# Patient Record
Sex: FEMALE | Race: OTHER | NOT HISPANIC OR LATINO | URBAN - METROPOLITAN AREA
[De-identification: names, ages, dates, MRNs, and addresses within clinical notes are randomized per-mention and may not be internally consistent; named-entity substitution may affect disease eponyms.]

---

## 2017-04-03 ENCOUNTER — EMERGENCY (EMERGENCY)
Facility: HOSPITAL | Age: 27
LOS: 1 days | Discharge: HOME | End: 2017-04-03
Attending: EMERGENCY MEDICINE | Admitting: EMERGENCY MEDICINE
Payer: COMMERCIAL

## 2017-04-03 VITALS
HEIGHT: 64 IN | TEMPERATURE: 98 F | RESPIRATION RATE: 18 BRPM | WEIGHT: 126.99 LBS | HEART RATE: 75 BPM | SYSTOLIC BLOOD PRESSURE: 92 MMHG | DIASTOLIC BLOOD PRESSURE: 64 MMHG | OXYGEN SATURATION: 96 %

## 2017-04-03 DIAGNOSIS — K52.9 NONINFECTIVE GASTROENTERITIS AND COLITIS, UNSPECIFIED: ICD-10-CM

## 2017-04-03 DIAGNOSIS — R19.7 DIARRHEA, UNSPECIFIED: ICD-10-CM

## 2017-04-03 LAB
ALBUMIN SERPL ELPH-MCNC: 4.1 G/DL — SIGNIFICANT CHANGE UP (ref 3.4–5)
ALP SERPL-CCNC: 73 U/L — SIGNIFICANT CHANGE UP (ref 40–120)
ALT FLD-CCNC: 27 U/L — SIGNIFICANT CHANGE UP (ref 12–42)
AMYLASE P1 CFR SERPL: 52 U/L — SIGNIFICANT CHANGE UP (ref 25–115)
ANION GAP SERPL CALC-SCNC: 12 MMOL/L — SIGNIFICANT CHANGE UP (ref 9–16)
AST SERPL-CCNC: 21 U/L — SIGNIFICANT CHANGE UP (ref 15–37)
BILIRUB SERPL-MCNC: 0.8 MG/DL — SIGNIFICANT CHANGE UP (ref 0.2–1.2)
BUN SERPL-MCNC: 15 MG/DL — SIGNIFICANT CHANGE UP (ref 7–23)
CALCIUM SERPL-MCNC: 9.1 MG/DL — SIGNIFICANT CHANGE UP (ref 8.5–10.5)
CHLORIDE SERPL-SCNC: 103 MMOL/L — SIGNIFICANT CHANGE UP (ref 96–108)
CO2 SERPL-SCNC: 23 MMOL/L — SIGNIFICANT CHANGE UP (ref 22–31)
CREAT SERPL-MCNC: 0.79 MG/DL — SIGNIFICANT CHANGE UP (ref 0.5–1.3)
GLUCOSE SERPL-MCNC: 146 MG/DL — HIGH (ref 70–99)
HCT VFR BLD CALC: 39.4 % — SIGNIFICANT CHANGE UP (ref 34.5–45)
HGB BLD-MCNC: 13.8 G/DL — SIGNIFICANT CHANGE UP (ref 11.5–15.5)
LIDOCAIN IGE QN: 86 U/L — SIGNIFICANT CHANGE UP (ref 73–393)
MCHC RBC-ENTMCNC: 31.2 PG — SIGNIFICANT CHANGE UP (ref 27–34)
MCHC RBC-ENTMCNC: 35 G/DL — SIGNIFICANT CHANGE UP (ref 32–36)
MCV RBC AUTO: 89.1 FL — SIGNIFICANT CHANGE UP (ref 80–100)
PLATELET # BLD AUTO: 220 K/UL — SIGNIFICANT CHANGE UP (ref 150–400)
POTASSIUM SERPL-MCNC: 4.1 MMOL/L — SIGNIFICANT CHANGE UP (ref 3.5–5.3)
POTASSIUM SERPL-SCNC: 4.1 MMOL/L — SIGNIFICANT CHANGE UP (ref 3.5–5.3)
PROT SERPL-MCNC: 7.2 G/DL — SIGNIFICANT CHANGE UP (ref 6.4–8.2)
RBC # BLD: 4.42 M/UL — SIGNIFICANT CHANGE UP (ref 3.8–5.2)
RBC # FLD: 12.3 % — SIGNIFICANT CHANGE UP (ref 10.3–16.9)
SODIUM SERPL-SCNC: 138 MMOL/L — SIGNIFICANT CHANGE UP (ref 132–145)
WBC # BLD: 13.8 K/UL — HIGH (ref 3.8–10.5)
WBC # FLD AUTO: 13.8 K/UL — HIGH (ref 3.8–10.5)

## 2017-04-03 PROCEDURE — 74010: CPT | Mod: 26

## 2017-04-03 PROCEDURE — 74020: CPT | Mod: 26

## 2017-04-03 PROCEDURE — 99284 EMERGENCY DEPT VISIT MOD MDM: CPT | Mod: 25

## 2017-04-03 RX ORDER — FAMOTIDINE 10 MG/ML
20 INJECTION INTRAVENOUS ONCE
Qty: 0 | Refills: 0 | Status: COMPLETED | OUTPATIENT
Start: 2017-04-03 | End: 2017-04-03

## 2017-04-03 RX ORDER — ONDANSETRON 8 MG/1
1 TABLET, FILM COATED ORAL
Qty: 12 | Refills: 0 | OUTPATIENT
Start: 2017-04-03 | End: 2017-04-06

## 2017-04-03 RX ORDER — METOCLOPRAMIDE HCL 10 MG
10 TABLET ORAL ONCE
Qty: 0 | Refills: 0 | Status: COMPLETED | OUTPATIENT
Start: 2017-04-03 | End: 2017-04-03

## 2017-04-03 RX ORDER — ONDANSETRON 8 MG/1
4 TABLET, FILM COATED ORAL ONCE
Qty: 0 | Refills: 0 | Status: COMPLETED | OUTPATIENT
Start: 2017-04-03 | End: 2017-04-03

## 2017-04-03 RX ORDER — SODIUM CHLORIDE 9 MG/ML
1000 INJECTION INTRAMUSCULAR; INTRAVENOUS; SUBCUTANEOUS ONCE
Qty: 0 | Refills: 0 | Status: COMPLETED | OUTPATIENT
Start: 2017-04-03 | End: 2017-04-03

## 2017-04-03 RX ADMIN — FAMOTIDINE 20 MILLIGRAM(S): 10 INJECTION INTRAVENOUS at 01:37

## 2017-04-03 RX ADMIN — ONDANSETRON 4 MILLIGRAM(S): 8 TABLET, FILM COATED ORAL at 00:53

## 2017-04-03 RX ADMIN — Medication 10 MILLIGRAM(S): at 01:37

## 2017-04-03 RX ADMIN — SODIUM CHLORIDE 1000 MILLILITER(S): 9 INJECTION INTRAMUSCULAR; INTRAVENOUS; SUBCUTANEOUS at 01:04

## 2017-04-03 NOTE — ED PROVIDER NOTE - OBJECTIVE STATEMENT
26 yo female G0Po, lmp 2 weeks ago, +IUD in place, to ED c/o n/v/d/epigastric abdominal cramping sudden onset 1700 yesterday. Pt states has had 20 episodes of vomiting/diarrhea. No international travel within past 30 days, denies f/c/rash/dysuria.  Cannot recall consuming any bad food. No ill contacts. 28 yo female G0Po, lmp 2 weeks ago, +IUD in place, to ED c/o n/v/d/epigastric abdominal cramping sudden onset 1700 yesterday. Pt states has had 20 episodes of vomiting/diarrhea. No international travel within past 30 days, denies f/c/rash/dysuria.  Cannot recall consuming any bad food. No ill contacts. Denies recent abx use.

## 2017-04-03 NOTE — ED ADULT TRIAGE NOTE - CHIEF COMPLAINT QUOTE
walked in c/o epigastric pain radiating to umbilical area with N/V/D, non-bloody since 5 pm yesterday

## 2018-10-12 NOTE — ED ADULT NURSE NOTE - THOUGHTS OF SUICIDE/SELF-HARM YN, MLM
October 12, 2018      Malorie SHERMAN [address]     [place] [address]    Dear ,      This letter is to remind you that you are due for your mammogram     Please call [phone] to schedule your appointment at your earliest convenience.     If you have completed the tests outside of [place], please have the results forwarded to our office. We will update the chart for your primary Physician to review before your next annual physical.     Sincerely,      Jocelynn Pham MD/tk     No

## 2021-08-26 PROBLEM — Z00.00 ENCOUNTER FOR PREVENTIVE HEALTH EXAMINATION: Status: ACTIVE | Noted: 2021-08-26

## 2021-08-27 NOTE — PHYSICAL EXAM
[de-identified] : General: Patient is awake and alert, demonstrates appropriate mood and affect, exhibits normal breathing and is in no acute distress.\par Constitutional: Well appearing in no apparent distress\par Skin: The skin is intact, warm, pink, and dry over the area examined.\par Lymph: There is no lymphedema\par Cardiovascular: There is brisk capillary refill in the digits of the affected extremity. They are symmetric pulses in the bilateral upper and lower extremities. \par Respiratory: The patient is in no apparent respiratory distress. They're taking full deep breaths without use of accessory muscles or evidence of audible wheezes or stridor without the use of a stethoscope. \par Neurological: 5/5 motor strength in the main muscle groups of bilateral upper extremities, sensory intact in bilateral upper extremities\par Musculoskeletal:. normal gait for age. good posture. normal clinical alignment in upper and lower extremities. normal clinical alignment of the spine. full range of motion in bilateral upper and lower extremities except as noted below\par \par Gait: [normal nonantalgic gait, normal dain, heel strike and toe off]\par \par Range of Motion: \par Right:                                                                            Left:\par Active [0-130] / Passive [0-130]                                Active [0-130] / Passive [0-130]\par \par [Right/Left] Knee\par \par Skin: \par Intact\par Erythema [No]\par Effusion [No]\par \par Patellofemoral: \par TTP Quad Insertion [No]\par TTP Distal Pole Patella: [No]\par Patellar tenderness [No]\par Patellar grind  [negative]\par Patellar compression test [negative] \par Tracking: [normal]\par J-sign:  [negative]\par Apprehension  [negative]\par Lateral translation:  [2] quadrants \par Medial translation: [2] quadrants \par \par Strength: [5]/5 extension,  [5]/5 flexion\par \par Tenderness: [None]\par \par Stability: \par Varus stress: [stable]  at 0 and 30 degrees\par Valgus stress: [stable] at 0 and 30 degrees\par Lachman: [1A]\par Anterior drawer:  [negative]\par Posterior drawer:  [negative] \par Pivot-shift:  [negative] \par \par Meniscus:\par Jointline tenderness:  [No] \par Cary:  [negative] \par Thessaly: [negative] \par \par Motor: EHL/FHL/TA/Gs intact\par \par Sens: S/S/T/SPN/DPN intact to light touch\par \par Vasc: 2+ DP and PT Pulses\par \par

## 2021-08-27 NOTE — HISTORY OF PRESENT ILLNESS
[de-identified] : Referring Provider:\par Chief Complaint:\par Date of Injury/onset:\par \par 30 y/o female presents for evaluation of right knee pain.\par \par Summary of symptoms:\par Severity of Pain:  /10\par Character of Pain:\par What and when makes pain worse:\par Associated symptoms: \par Alleviating factors:\par \par \par -------------\par Review of Systems:\par Constitutional:         ENT:                      Eyes\par Good General Health        [Yes]     Hearing Loss/Ringing            [No]        Wear Glasses/contact       [No]\par Recent Weight Change     [No]       Sinus Problems                     [No]        Blurred/double Vision         [No]\par Night Sweats, Fevers       [No]       Nose Bleeds                         [No]        Eye Disease or Injury         [No]\par Fatigue                              [No]       Sore Throat/Voice Change   [No]        Glaucoma                           [No]\par Cardiovascular         Respiratory                    Gastrointestinal\par Chest Pain                         [No]       Shortness of Breath             [No]       Nausea/Vomiting                  [No]\par Palpitations                        [No]       Cough                                    [No]       Abdominal Pain                    [No]\par Heart Trouble                    [No]        Wheezing/Asthma                [No]       Rectal Bleeding                    [No]\par Swelling hands/feet          [No]       Coughing up Blood                [No]       Bowel Problems                   [No]\par Musculoskeletal         Neurological                     Integumentary\par Muscle Pain or Cramps     [No]       Frequent Headaches            [No]       Change in hair or nails         [No]\par Stiffness/swelling joints   [No]       Paralysis or Tremors             [No]       Rashes or itching                [No]\par Joint Pain                           [No]      Convulsions/Seizures            [No]       Breast Lump                        [No]\par Trouble Walking                 [No]      Numbness/Tingling                 [No]       Breast pain or discharge    [No]\par Endocrine        Hematologic/Lymphatic                    Allergic/Immunologic\par Excessive thirst/urination  [No]     Bruise easily                          [No]        Food Allergies                      [No]\par Thyroid Disease                [No]     Slow to heal                           [No]        Aspirin Allergies                  [No]\par Hormone Problem              [No]     Enlarged glands                     [No]        Antibiotic Allergies               [No]\par  - Male          - Female                     Psychiatric\par Blood in Urine                    [No]      Blood in Urine                         [No]       Insomnia                              [No]\par Kidney Stone                     [No]      Kidney Stone                          [No]      Confusion/memory loss       [No]\par Sexual Problems                [No]      Sexual Problems                    [No]       Depression                          [No]\par Testicular Pain                   [No]       Menstrual Problems               [No]\par \par Please refer to the attached intake form for additional history and details.\par \par

## 2021-08-31 ENCOUNTER — APPOINTMENT (OUTPATIENT)
Dept: ORTHOPEDIC SURGERY | Facility: CLINIC | Age: 31
End: 2021-08-31
Payer: COMMERCIAL

## 2021-09-02 ENCOUNTER — APPOINTMENT (OUTPATIENT)
Dept: ORTHOPEDIC SURGERY | Facility: CLINIC | Age: 31
End: 2021-09-02

## 2021-09-09 ENCOUNTER — APPOINTMENT (OUTPATIENT)
Dept: ORTHOPEDIC SURGERY | Facility: CLINIC | Age: 31
End: 2021-09-09
Payer: COMMERCIAL

## 2021-09-09 VITALS — BODY MASS INDEX: 23.05 KG/M2 | HEIGHT: 64 IN | RESPIRATION RATE: 16 BRPM | WEIGHT: 135 LBS

## 2021-09-09 DIAGNOSIS — Z78.9 OTHER SPECIFIED HEALTH STATUS: ICD-10-CM

## 2021-09-09 DIAGNOSIS — M25.552 PAIN IN LEFT HIP: ICD-10-CM

## 2021-09-09 DIAGNOSIS — M25.561 PAIN IN RIGHT KNEE: ICD-10-CM

## 2021-09-09 PROCEDURE — 99203 OFFICE O/P NEW LOW 30 MIN: CPT

## 2024-01-16 NOTE — ED PROVIDER NOTE - SEVERITY
Rivaroxaban/Xarelto - Compliance/Rivaroxaban/Xarelto - Dietary Advice/Rivaroxaban/Xarelto - Follow up monitoring/Rivaroxaban/Xarelto - Potential for adverse drug reactions and interactions
MODERATE
